# Patient Record
(demographics unavailable — no encounter records)

---

## 2024-10-25 NOTE — ASSESSMENT
[FreeTextEntry1] : 17 year old with ADHD and tics. Neurologic examination as above, stable on current medication regimen. Does have poor sleep, but not interested in switching Concerta to Jornay for evening dosing.

## 2024-10-25 NOTE — PHYSICAL EXAM
[Well-appearing] : well-appearing [Normocephalic] : normocephalic [No dysmorphic facial features] : no dysmorphic facial features [Alert] : alert [Well related, good eye contact] : well related, good eye contact [Conversant] : conversant [Normal speech and language] : normal speech and language [Good walking balance] : good walking balance [Normal gait] : normal gait [de-identified] : no resp distress, no retractions  [de-identified] : grossly intact [de-identified] : walks well

## 2024-10-25 NOTE — HISTORY OF PRESENT ILLNESS
[FreeTextEntry1] : Since the last visit in May 2024:    Continues Concerta 72mg, but taking it later in morning (10-11am). He continues Intuniv 4mg in AM and guanfacine 2mg PRN in afternoon. Clonidine 0.3mg qhs. Patient feels tics are stable and Concerta is still helping with focus. Denies missing doses of Concerta, but occasionally skipping Intuniv if he wakes up late.   Poor sleep hygiene recently, sleeping very late and sleeping in until 10am-2pm. Denies difficulty sleeping, but says that he doesn't feel tired until very late. Denies association with taking Concerta later in the day.